# Patient Record
Sex: FEMALE | Race: WHITE | Employment: UNEMPLOYED | URBAN - METROPOLITAN AREA
[De-identification: names, ages, dates, MRNs, and addresses within clinical notes are randomized per-mention and may not be internally consistent; named-entity substitution may affect disease eponyms.]

---

## 2021-09-20 ENCOUNTER — HOSPITAL ENCOUNTER (INPATIENT)
Age: 31
LOS: 2 days | Discharge: HOME | DRG: 177 | End: 2021-09-22
Attending: EMERGENCY MEDICINE | Admitting: INTERNAL MEDICINE
Payer: COMMERCIAL

## 2021-09-20 ENCOUNTER — APPOINTMENT (OUTPATIENT)
Dept: GENERAL RADIOLOGY | Age: 31
DRG: 177 | End: 2021-09-20

## 2021-09-20 DIAGNOSIS — U07.1 COVID-19: ICD-10-CM

## 2021-09-20 DIAGNOSIS — R11.2 NAUSEA VOMITING AND DIARRHEA: ICD-10-CM

## 2021-09-20 DIAGNOSIS — J96.01 ACUTE RESPIRATORY FAILURE WITH HYPOXIA (HCC): Primary | ICD-10-CM

## 2021-09-20 DIAGNOSIS — Z20.822 SUSPECTED COVID-19 VIRUS INFECTION: ICD-10-CM

## 2021-09-20 DIAGNOSIS — R19.7 NAUSEA VOMITING AND DIARRHEA: ICD-10-CM

## 2021-09-20 PROBLEM — R09.02 HYPOXIA: Status: ACTIVE | Noted: 2021-09-20

## 2021-09-20 LAB
A/G RATIO: 1.3 (ref 1.1–2.2)
ALBUMIN SERPL-MCNC: 4.4 G/DL (ref 3.4–5)
ALP BLD-CCNC: 60 U/L (ref 40–129)
ALT SERPL-CCNC: 67 U/L (ref 10–40)
ANION GAP SERPL CALCULATED.3IONS-SCNC: 14 MMOL/L (ref 3–16)
AST SERPL-CCNC: 58 U/L (ref 15–37)
BACTERIA: ABNORMAL /HPF
BASOPHILS ABSOLUTE: 0 K/UL (ref 0–0.2)
BASOPHILS RELATIVE PERCENT: 0.4 %
BILIRUB SERPL-MCNC: 0.6 MG/DL (ref 0–1)
BILIRUBIN URINE: ABNORMAL
BLOOD, URINE: ABNORMAL
BUN BLDV-MCNC: 11 MG/DL (ref 7–20)
CALCIUM SERPL-MCNC: 8.9 MG/DL (ref 8.3–10.6)
CHLORIDE BLD-SCNC: 100 MMOL/L (ref 99–110)
CLARITY: ABNORMAL
CO2: 22 MMOL/L (ref 21–32)
COLOR: ABNORMAL
CREAT SERPL-MCNC: 0.8 MG/DL (ref 0.6–1.1)
EKG ATRIAL RATE: 106 BPM
EKG DIAGNOSIS: NORMAL
EKG P AXIS: 53 DEGREES
EKG P-R INTERVAL: 138 MS
EKG Q-T INTERVAL: 330 MS
EKG QRS DURATION: 80 MS
EKG QTC CALCULATION (BAZETT): 438 MS
EKG R AXIS: 67 DEGREES
EKG T AXIS: 45 DEGREES
EKG VENTRICULAR RATE: 106 BPM
EOSINOPHILS ABSOLUTE: 0 K/UL (ref 0–0.6)
EOSINOPHILS RELATIVE PERCENT: 0.1 %
EPITHELIAL CELLS, UA: ABNORMAL /HPF (ref 0–5)
GFR AFRICAN AMERICAN: >60
GFR NON-AFRICAN AMERICAN: >60
GLOBULIN: 3.4 G/DL
GLUCOSE BLD-MCNC: 109 MG/DL (ref 70–99)
GLUCOSE URINE: NEGATIVE MG/DL
HCG QUALITATIVE: NEGATIVE
HCG(URINE) PREGNANCY TEST: NEGATIVE
HCT VFR BLD CALC: 43.8 % (ref 36–48)
HEMOGLOBIN: 14.7 G/DL (ref 12–16)
KETONES, URINE: 40 MG/DL
LEUKOCYTE ESTERASE, URINE: NEGATIVE
LIPASE: 38 U/L (ref 13–60)
LYMPHOCYTES ABSOLUTE: 1.2 K/UL (ref 1–5.1)
LYMPHOCYTES RELATIVE PERCENT: 29.6 %
MCH RBC QN AUTO: 29.1 PG (ref 26–34)
MCHC RBC AUTO-ENTMCNC: 33.5 G/DL (ref 31–36)
MCV RBC AUTO: 86.8 FL (ref 80–100)
MICROSCOPIC EXAMINATION: YES
MONOCYTES ABSOLUTE: 0.3 K/UL (ref 0–1.3)
MONOCYTES RELATIVE PERCENT: 8.3 %
NEUTROPHILS ABSOLUTE: 2.6 K/UL (ref 1.7–7.7)
NEUTROPHILS RELATIVE PERCENT: 61.6 %
NITRITE, URINE: NEGATIVE
PDW BLD-RTO: 14.3 % (ref 12.4–15.4)
PH UA: 6.5 (ref 5–8)
PLATELET # BLD: 180 K/UL (ref 135–450)
PMV BLD AUTO: 8 FL (ref 5–10.5)
POTASSIUM REFLEX MAGNESIUM: 3.8 MMOL/L (ref 3.5–5.1)
PROTEIN UA: 30 MG/DL
RBC # BLD: 5.05 M/UL (ref 4–5.2)
RBC UA: ABNORMAL /HPF (ref 0–4)
SARS-COV-2, NAAT: NOT DETECTED
SODIUM BLD-SCNC: 136 MMOL/L (ref 136–145)
SPECIFIC GRAVITY UA: 1.01 (ref 1–1.03)
TOTAL PROTEIN: 7.8 G/DL (ref 6.4–8.2)
TROPONIN: <0.01 NG/ML
URINE TYPE: ABNORMAL
UROBILINOGEN, URINE: 0.2 E.U./DL
WBC # BLD: 4.1 K/UL (ref 4–11)
WBC UA: ABNORMAL /HPF (ref 0–5)

## 2021-09-20 PROCEDURE — 71045 X-RAY EXAM CHEST 1 VIEW: CPT

## 2021-09-20 PROCEDURE — 84484 ASSAY OF TROPONIN QUANT: CPT

## 2021-09-20 PROCEDURE — 36415 COLL VENOUS BLD VENIPUNCTURE: CPT

## 2021-09-20 PROCEDURE — 93005 ELECTROCARDIOGRAM TRACING: CPT | Performed by: EMERGENCY MEDICINE

## 2021-09-20 PROCEDURE — 2700000000 HC OXYGEN THERAPY PER DAY

## 2021-09-20 PROCEDURE — 99285 EMERGENCY DEPT VISIT HI MDM: CPT

## 2021-09-20 PROCEDURE — 6370000000 HC RX 637 (ALT 250 FOR IP): Performed by: EMERGENCY MEDICINE

## 2021-09-20 PROCEDURE — 0202U NFCT DS 22 TRGT SARS-COV-2: CPT

## 2021-09-20 PROCEDURE — 85025 COMPLETE CBC W/AUTO DIFF WBC: CPT

## 2021-09-20 PROCEDURE — 80053 COMPREHEN METABOLIC PANEL: CPT

## 2021-09-20 PROCEDURE — 84703 CHORIONIC GONADOTROPIN ASSAY: CPT

## 2021-09-20 PROCEDURE — 96374 THER/PROPH/DIAG INJ IV PUSH: CPT

## 2021-09-20 PROCEDURE — 99221 1ST HOSP IP/OBS SF/LOW 40: CPT | Performed by: NURSE PRACTITIONER

## 2021-09-20 PROCEDURE — 93010 ELECTROCARDIOGRAM REPORT: CPT | Performed by: INTERNAL MEDICINE

## 2021-09-20 PROCEDURE — 1200000000 HC SEMI PRIVATE

## 2021-09-20 PROCEDURE — 81001 URINALYSIS AUTO W/SCOPE: CPT

## 2021-09-20 PROCEDURE — 2580000003 HC RX 258: Performed by: EMERGENCY MEDICINE

## 2021-09-20 PROCEDURE — 83690 ASSAY OF LIPASE: CPT

## 2021-09-20 PROCEDURE — 94761 N-INVAS EAR/PLS OXIMETRY MLT: CPT

## 2021-09-20 PROCEDURE — 2580000003 HC RX 258: Performed by: PHYSICIAN ASSISTANT

## 2021-09-20 PROCEDURE — 6360000002 HC RX W HCPCS: Performed by: EMERGENCY MEDICINE

## 2021-09-20 PROCEDURE — 6360000002 HC RX W HCPCS: Performed by: PHYSICIAN ASSISTANT

## 2021-09-20 PROCEDURE — 87635 SARS-COV-2 COVID-19 AMP PRB: CPT

## 2021-09-20 RX ORDER — ONDANSETRON 2 MG/ML
4 INJECTION INTRAMUSCULAR; INTRAVENOUS EVERY 6 HOURS PRN
Status: DISCONTINUED | OUTPATIENT
Start: 2021-09-20 | End: 2021-09-22 | Stop reason: HOSPADM

## 2021-09-20 RX ORDER — ACETAMINOPHEN 325 MG/1
650 TABLET ORAL EVERY 6 HOURS PRN
Status: DISCONTINUED | OUTPATIENT
Start: 2021-09-20 | End: 2021-09-22 | Stop reason: HOSPADM

## 2021-09-20 RX ORDER — ACETAMINOPHEN 650 MG/1
650 SUPPOSITORY RECTAL EVERY 6 HOURS PRN
Status: DISCONTINUED | OUTPATIENT
Start: 2021-09-20 | End: 2021-09-22 | Stop reason: HOSPADM

## 2021-09-20 RX ORDER — SODIUM CHLORIDE 0.9 % (FLUSH) 0.9 %
5-40 SYRINGE (ML) INJECTION PRN
Status: DISCONTINUED | OUTPATIENT
Start: 2021-09-20 | End: 2021-09-22 | Stop reason: HOSPADM

## 2021-09-20 RX ORDER — SODIUM CHLORIDE 9 MG/ML
25 INJECTION, SOLUTION INTRAVENOUS PRN
Status: DISCONTINUED | OUTPATIENT
Start: 2021-09-20 | End: 2021-09-22 | Stop reason: HOSPADM

## 2021-09-20 RX ORDER — 0.9 % SODIUM CHLORIDE 0.9 %
1000 INTRAVENOUS SOLUTION INTRAVENOUS ONCE
Status: COMPLETED | OUTPATIENT
Start: 2021-09-20 | End: 2021-09-20

## 2021-09-20 RX ORDER — PREDNISONE 20 MG/1
60 TABLET ORAL ONCE
Status: COMPLETED | OUTPATIENT
Start: 2021-09-20 | End: 2021-09-20

## 2021-09-20 RX ORDER — GUAIFENESIN/DEXTROMETHORPHAN 100-10MG/5
5 SYRUP ORAL EVERY 4 HOURS PRN
Status: DISCONTINUED | OUTPATIENT
Start: 2021-09-20 | End: 2021-09-22 | Stop reason: HOSPADM

## 2021-09-20 RX ORDER — ONDANSETRON 2 MG/ML
4 INJECTION INTRAMUSCULAR; INTRAVENOUS ONCE
Status: COMPLETED | OUTPATIENT
Start: 2021-09-20 | End: 2021-09-20

## 2021-09-20 RX ORDER — POLYETHYLENE GLYCOL 3350 17 G/17G
17 POWDER, FOR SOLUTION ORAL DAILY PRN
Status: DISCONTINUED | OUTPATIENT
Start: 2021-09-20 | End: 2021-09-22 | Stop reason: HOSPADM

## 2021-09-20 RX ORDER — SODIUM CHLORIDE 0.9 % (FLUSH) 0.9 %
5-40 SYRINGE (ML) INJECTION EVERY 12 HOURS SCHEDULED
Status: DISCONTINUED | OUTPATIENT
Start: 2021-09-20 | End: 2021-09-22 | Stop reason: HOSPADM

## 2021-09-20 RX ORDER — ONDANSETRON 4 MG/1
4 TABLET, ORALLY DISINTEGRATING ORAL EVERY 8 HOURS PRN
Status: DISCONTINUED | OUTPATIENT
Start: 2021-09-20 | End: 2021-09-22 | Stop reason: HOSPADM

## 2021-09-20 RX ADMIN — PREDNISONE 60 MG: 20 TABLET ORAL at 13:37

## 2021-09-20 RX ADMIN — Medication 10 ML: at 20:18

## 2021-09-20 RX ADMIN — SODIUM CHLORIDE 1000 ML: 9 INJECTION, SOLUTION INTRAVENOUS at 11:43

## 2021-09-20 RX ADMIN — ENOXAPARIN SODIUM 30 MG: 30 INJECTION SUBCUTANEOUS at 20:17

## 2021-09-20 RX ADMIN — ONDANSETRON HYDROCHLORIDE 4 MG: 2 INJECTION, SOLUTION INTRAMUSCULAR; INTRAVENOUS at 11:43

## 2021-09-20 ASSESSMENT — ENCOUNTER SYMPTOMS
COUGH: 0
SHORTNESS OF BREATH: 0
PHOTOPHOBIA: 0
ABDOMINAL DISTENTION: 0
DIARRHEA: 1
NAUSEA: 1
WHEEZING: 0
RHINORRHEA: 0
VOMITING: 1
BACK PAIN: 0

## 2021-09-20 ASSESSMENT — PAIN SCALES - GENERAL: PAINLEVEL_OUTOF10: 0

## 2021-09-20 NOTE — Clinical Note
Patient Class: Inpatient [101]   REQUIRED: Diagnosis: Hypoxia [848763]   Estimated Length of Stay: Estimated stay of more than 2 midnights   Telemetry/Cardiac Monitoring Required?: Yes

## 2021-09-20 NOTE — H&P
Hospital Medicine History & Physical      PCP: Ivanna Ovalle, APRN - CNP    Date of Admission: 9/20/2021    Date of Service: Pt seen/examined on 9/20/2021     Chief Complaint:    Chief Complaint   Patient presents with    Diarrhea     with vomiting x 4 days. History Of Present Illness: The patient is a 32 y.o. female with lymphedema and h/o cellulitis who presents to [facility] with c/o vomiting and diarrhea. She reports symptoms ongoing since last Thursday. She has had nausea, vomiting, diarrhea, cough, and fatigue. Denies fever, shortness of breath, chest pain, or abdominal pain. She is not vaccinated. Per EMR, she has had positive COVID contacts. She is hypoxic and requiring 2 L O2. CXR negative. Labs with elevated LFT's. Admitted to med-surg. Pulmonology consulted. Past Medical History:        Diagnosis Date    Cellulitis     Lymphedema        Past Surgical History:    History reviewed. No pertinent surgical history. Medications Prior to Admission:    Prior to Admission medications    Not on File       Allergies:  Patient has no known allergies. Social History:    TOBACCO:   reports that she has never smoked. She has never used smokeless tobacco.  ETOH:   reports no history of alcohol use. Family History:   Positive as follows:    History reviewed. No pertinent family history. REVIEW OF SYSTEMS:       Constitutional: Negative for fever + fatigue  Respiratory: Negative  for dyspnea, + cough   Cardiovascular: Negative for chest pain   Gastrointestinal: + nausea, vomiting, diarrhea   Genitourinary: Negative for hematuria   Musculoskeletal: Negative for arthralgias   Skin: Negative for rash   Neurological: Negative for syncope   Psychiatric/Behavorial: Negative for anxiety    PHYSICAL EXAM:    /78   Pulse 113   Temp 99.4 °F (37.4 °C) (Oral)   Resp 18   Ht 5' 6\" (1.676 m)   Wt 300 lb (136.1 kg)   SpO2 94%   BMI 48.42 kg/m²     Gen: No distress. Alert. Eyes: PERRL. No sclera icterus. No conjunctival injection. ENT: No discharge. Pharynx clear. Neck: Trachea midline. Resp: No accessory muscle use. + crackles. No wheezes. No rhonchi. CV: Regular rate. Regular rhythm. No murmur. No rub. No edema. GI: Non-tender. Non-distended. Normal bowel sounds. No hernia. Skin: Warm and dry. No nodule on exposed extremities. No rash on exposed extremities. M/S: No cyanosis. No joint deformity. No clubbing. Neuro: Awake. Grossly nonfocal    Psych: Oriented x 3. No anxiety or agitation. CBC:   Recent Labs     09/20/21  1100   WBC 4.1   HGB 14.7   HCT 43.8   MCV 86.8        BMP:   Recent Labs     09/20/21  1100      K 3.8      CO2 22   BUN 11   CREATININE 0.8     LIVER PROFILE:   Recent Labs     09/20/21  1100   AST 58*   ALT 67*   LIPASE 38.0   BILITOT 0.6   ALKPHOS 60     UA:  Recent Labs     09/20/21  1045   COLORU RED*   PHUR 6.5   WBCUA 0-2   RBCUA *   BACTERIA Rare*   CLARITYU CLOUDY*   SPECGRAV 1.010   LEUKOCYTESUR Negative   UROBILINOGEN 0.2   BILIRUBINUR SMALL*   BLOODU LARGE*   GLUCOSEU Negative         CARDIAC ENZYMES  Recent Labs     09/20/21  1100   TROPONINI <0.01         CULTURES  COVID: not detected    EKG:  I have reviewed the EKG with the following interpretation:   Sinus tachycardia    RADIOLOGY  XR CHEST PORTABLE   Final Result   No acute process. Active Problems:    Hypoxia  Resolved Problems:    * No resolved hospital problems. *        ASSESSMENT/PLAN:  Acute hypoxia  Possible COVID  - admit to med-surg. Pulmonology consulted  - hypoxic, requiring 2 L O2.  - received prednisone 60 mg once today. - symptoms ongoing since last Thursday. Rapid COVID negative. Symptoms are concerning for COVID and she is requiring 2 L. Keep in precautions for now. Nausea, vomiting, diarrhea  - regular diet ordered  - IVF's. - antiemetics as needed. Hematuria  - Currently on menses.      Elevated LFT's  - Trend LFT's. Morbid Obesity  - Body mass index is 48.42 kg/m². - Complicating assessment and treatment. Placing patient at risk for multiple co-morbidities as well as early death and contributing to the patient's presentation.   - Counseled on weight loss. DVT Prophylaxis: Lovenox 30 mg BID  Diet: ADULT DIET;  Regular  Code Status: Full Code    Evelyn Romero FNP-C  9/20/2021

## 2021-09-20 NOTE — ED NOTES
Pt report given to [employer] team. Pt remains alert and oriented, no apparent signs of distress. Vitals remain WNL. Transport team denies further questions. Pt care transferred at this time.      Iliana Pennington RN  09/20/21 0839

## 2021-09-20 NOTE — ED NOTES
Report called to [facility], spoke to Le Harvey. RN denies further questions. Pt transferred at this time.      Armani Pressley RN  09/20/21 4989

## 2021-09-20 NOTE — PROGRESS NOTES
Handoff report given and pt care transferred to [facility]. Pt denies needs at this time. Call light within reach.

## 2021-09-20 NOTE — PLAN OF CARE
Hypoxia  Suspected COVID 19, will need test on arrival to [facility].  +contacts in her community   N/V/D    Will hold off on ordering decadron or remdesivir until confirmed COVID 19 positive     German Pearson PA-C  9/20/2021 2:24 PM

## 2021-09-20 NOTE — ED NOTES
Pt ambulated by Medic. Medic states pulse ox dropped to 85% and . MD made aware.      Ivis Moya RN  09/20/21 2548

## 2021-09-20 NOTE — PROGRESS NOTES
Admission questions complete. Refused 4 Eyes assessment. A/O x 4. Denies any pain. Dietary notified. Oriented to room. Bed locked/lowest position. Call light within reach.

## 2021-09-21 PROBLEM — U07.1 ACUTE RESPIRATORY DISEASE DUE TO COVID-19 VIRUS: Status: ACTIVE | Noted: 2021-09-21

## 2021-09-21 PROBLEM — J06.9 ACUTE RESPIRATORY DISEASE DUE TO COVID-19 VIRUS: Status: ACTIVE | Noted: 2021-09-21

## 2021-09-21 LAB
A/G RATIO: 1.2 (ref 1.1–2.2)
ALBUMIN SERPL-MCNC: 4.3 G/DL (ref 3.4–5)
ALP BLD-CCNC: 58 U/L (ref 40–129)
ALT SERPL-CCNC: 61 U/L (ref 10–40)
ANION GAP SERPL CALCULATED.3IONS-SCNC: 15 MMOL/L (ref 3–16)
AST SERPL-CCNC: 51 U/L (ref 15–37)
BASOPHILS ABSOLUTE: 0 K/UL (ref 0–0.2)
BASOPHILS RELATIVE PERCENT: 0.2 %
BILIRUB SERPL-MCNC: 0.4 MG/DL (ref 0–1)
BUN BLDV-MCNC: 10 MG/DL (ref 7–20)
CALCIUM SERPL-MCNC: 9 MG/DL (ref 8.3–10.6)
CHLORIDE BLD-SCNC: 104 MMOL/L (ref 99–110)
CO2: 20 MMOL/L (ref 21–32)
CREAT SERPL-MCNC: 0.7 MG/DL (ref 0.6–1.1)
EOSINOPHILS ABSOLUTE: 0 K/UL (ref 0–0.6)
EOSINOPHILS RELATIVE PERCENT: 0 %
GFR AFRICAN AMERICAN: >60
GFR NON-AFRICAN AMERICAN: >60
GLOBULIN: 3.6 G/DL
GLUCOSE BLD-MCNC: 106 MG/DL (ref 70–99)
HCT VFR BLD CALC: 45.2 % (ref 36–48)
HEMOGLOBIN: 14.9 G/DL (ref 12–16)
LYMPHOCYTES ABSOLUTE: 1.4 K/UL (ref 1–5.1)
LYMPHOCYTES RELATIVE PERCENT: 29.3 %
MCH RBC QN AUTO: 29.4 PG (ref 26–34)
MCHC RBC AUTO-ENTMCNC: 32.9 G/DL (ref 31–36)
MCV RBC AUTO: 89.4 FL (ref 80–100)
MONOCYTES ABSOLUTE: 0.5 K/UL (ref 0–1.3)
MONOCYTES RELATIVE PERCENT: 10.1 %
NEUTROPHILS ABSOLUTE: 2.8 K/UL (ref 1.7–7.7)
NEUTROPHILS RELATIVE PERCENT: 60.4 %
ORGANISM: ABNORMAL
PDW BLD-RTO: 14.5 % (ref 12.4–15.4)
PLATELET # BLD: 184 K/UL (ref 135–450)
PMV BLD AUTO: 9.1 FL (ref 5–10.5)
POTASSIUM REFLEX MAGNESIUM: 4 MMOL/L (ref 3.5–5.1)
PRO-BNP: 49 PG/ML (ref 0–124)
PROCALCITONIN: 0.05 NG/ML (ref 0–0.15)
RBC # BLD: 5.06 M/UL (ref 4–5.2)
REPORT: NORMAL
RESPIRATORY PANEL PCR: ABNORMAL
SODIUM BLD-SCNC: 139 MMOL/L (ref 136–145)
TOTAL PROTEIN: 7.9 G/DL (ref 6.4–8.2)
WBC # BLD: 4.7 K/UL (ref 4–11)

## 2021-09-21 PROCEDURE — 2500000003 HC RX 250 WO HCPCS: Performed by: INTERNAL MEDICINE

## 2021-09-21 PROCEDURE — 83880 ASSAY OF NATRIURETIC PEPTIDE: CPT

## 2021-09-21 PROCEDURE — 86140 C-REACTIVE PROTEIN: CPT

## 2021-09-21 PROCEDURE — 84145 PROCALCITONIN (PCT): CPT

## 2021-09-21 PROCEDURE — 99255 IP/OBS CONSLTJ NEW/EST HI 80: CPT | Performed by: INTERNAL MEDICINE

## 2021-09-21 PROCEDURE — 99233 SBSQ HOSP IP/OBS HIGH 50: CPT | Performed by: INTERNAL MEDICINE

## 2021-09-21 PROCEDURE — 94761 N-INVAS EAR/PLS OXIMETRY MLT: CPT

## 2021-09-21 PROCEDURE — 36415 COLL VENOUS BLD VENIPUNCTURE: CPT

## 2021-09-21 PROCEDURE — XW033E5 INTRODUCTION OF REMDESIVIR ANTI-INFECTIVE INTO PERIPHERAL VEIN, PERCUTANEOUS APPROACH, NEW TECHNOLOGY GROUP 5: ICD-10-PCS | Performed by: INTERNAL MEDICINE

## 2021-09-21 PROCEDURE — 6360000002 HC RX W HCPCS: Performed by: INTERNAL MEDICINE

## 2021-09-21 PROCEDURE — 2580000003 HC RX 258: Performed by: INTERNAL MEDICINE

## 2021-09-21 PROCEDURE — 80053 COMPREHEN METABOLIC PANEL: CPT

## 2021-09-21 PROCEDURE — 1200000000 HC SEMI PRIVATE

## 2021-09-21 PROCEDURE — 6360000002 HC RX W HCPCS: Performed by: PHYSICIAN ASSISTANT

## 2021-09-21 PROCEDURE — 2700000000 HC OXYGEN THERAPY PER DAY

## 2021-09-21 PROCEDURE — 85025 COMPLETE CBC W/AUTO DIFF WBC: CPT

## 2021-09-21 PROCEDURE — 2580000003 HC RX 258: Performed by: PHYSICIAN ASSISTANT

## 2021-09-21 RX ORDER — 0.9 % SODIUM CHLORIDE 0.9 %
30 INTRAVENOUS SOLUTION INTRAVENOUS PRN
Status: DISCONTINUED | OUTPATIENT
Start: 2021-09-21 | End: 2021-09-22 | Stop reason: HOSPADM

## 2021-09-21 RX ADMIN — REMDESIVIR 200 MG: 100 INJECTION, POWDER, LYOPHILIZED, FOR SOLUTION INTRAVENOUS at 17:36

## 2021-09-21 RX ADMIN — ENOXAPARIN SODIUM 30 MG: 30 INJECTION SUBCUTANEOUS at 10:35

## 2021-09-21 RX ADMIN — Medication 10 ML: at 20:54

## 2021-09-21 RX ADMIN — ENOXAPARIN SODIUM 30 MG: 30 INJECTION SUBCUTANEOUS at 20:54

## 2021-09-21 RX ADMIN — DEXAMETHASONE 6 MG: 4 TABLET ORAL at 17:26

## 2021-09-21 RX ADMIN — SODIUM CHLORIDE 30 ML: 9 INJECTION, SOLUTION INTRAVENOUS at 17:30

## 2021-09-21 RX ADMIN — Medication 10 ML: at 10:35

## 2021-09-21 ASSESSMENT — PAIN SCALES - GENERAL
PAINLEVEL_OUTOF10: 0
PAINLEVEL_OUTOF10: 0

## 2021-09-21 NOTE — CONSULTS
Reason for referral and CC: COVID 19 disease    HISTORY OF PRESENT ILLNESS: 49-year-old female with a history of morbid obesity and lymphedema presented with nausea vomiting and diarrhea. Symptoms ongoing for 4 days. She denied chest pain or shortness of breath on admission. She is not vaccinated and has had exposure to Covid positive individuals. Her GI symptoms resolved the day before admission. She denies shortness of breath at rest.  Shee feels better with oxygen. Past Medical History:   Diagnosis Date    Cellulitis     COVID-19 09/20/2021    Lymphedema      History reviewed. No pertinent surgical history. Family History  family history is not on file. Social History:  reports that she has never smoked. She has never used smokeless tobacco.   reports no history of alcohol use. ALLERGIES:  Patient has No Known Allergies. Continuous Infusions:   sodium chloride       Scheduled Meds:   sodium chloride flush  5-40 mL IntraVENous 2 times per day    enoxaparin  30 mg SubCUTAneous BID     PRN Meds:  sodium chloride flush, sodium chloride, ondansetron **OR** ondansetron, polyethylene glycol, acetaminophen **OR** acetaminophen, guaiFENesin-dextromethorphan    REVIEW OF SYSTEMS:  Constitutional: Negative for fever  HENT: Negative for sore throat  Eyes: Negative for redness   Respiratory: + for dyspnea, cough  Cardiovascular: Negative for chest pain  Gastrointestinal: Negative for vomiting, diarrhea   Genitourinary: Negative for hematuria   Musculoskeletal: Negative for arthralgias   Skin: Negative for rash  Neurological: Negative for syncope  Hematological: Negative for adenopathy  Psychiatric/Behavorial: Negative for anxiety    PHYSICAL EXAM: /76   Pulse 93   Temp 97.3 °F (36.3 °C) (Oral)   Resp 18   Ht 5' 6\" (1.676 m)   Wt 300 lb (136.1 kg)   SpO2 95%   BMI 48.42 kg/m²  on 4 L  Constitutional:  No acute distress. Eyes: PERRL. Conjunctivae anicteric. ENT: Normal nose.  Normal tongue. Neck:  Trachea is midline. No thyroid tenderness. Respiratory: No accessory muscle usage. nodecreased breath sounds. No wheezes. No rales. No Rhonchi. Cardiovascular: Normal S1S2. No digit clubbing. No digit cyanosis. No LE edema. Capillary refill is normal.  Gastrointestinal: No mass palpated. No tenderness palpated. No umbilical hernia. Lymphatic: No cervical or supraclavicular adenopathy. Skin: No rash on the exposed extremities. No Nodules or induration on exposed extremities. Psychiatric: No anxiety or Agitation. Alert and Oriented to person, place and time. CBC:   Recent Labs     09/20/21  1100 09/21/21  0524   WBC 4.1 4.7   HGB 14.7 14.9   HCT 43.8 45.2   MCV 86.8 89.4    184     BMP:   Recent Labs     09/20/21  1100 09/21/21  0524    139   K 3.8 4.0    104   CO2 22 20*   BUN 11 10   CREATININE 0.8 0.7        Recent Labs     09/20/21  1100 09/21/21  0524   AST 58* 51*   ALT 67* 61*   LIPASE 38.0  --    BILITOT 0.6 0.4   ALKPHOS 60 58     No results for input(s): PROTIME, INR in the last 72 hours. Recent Labs     09/20/21  1045   COLORU RED*   PHUR 6.5   WBCUA 0-2   RBCUA *   BACTERIA Rare*   CLARITYU CLOUDY*   SPECGRAV 1.010   LEUKOCYTESUR Negative   UROBILINOGEN 0.2   BILIRUBINUR SMALL*   BLOODU LARGE*   GLUCOSEU Negative     No results for input(s): PHART, JDT8RBU, PO2ART in the last 72 hours. Rapid covid neg  SARS CoV-2 PCR + on resp panel    Chest imaging was reviewed by me and showed clear lungs    ASSESSMENT:  · Acute hypoxic respiratory failure  · COVID 19 disease  · Mldly elevated transaminases  · Morbid obesity    PLAN:  Droplet Plus Airborne Precautions   Supplemental oxygen to keep saturation greater than 92%  Decadron 6 mg QD, D# 1, monitor glucose closely  Remdesevir D# 1, daily monitoring of LFT to prevent liver toxicity. Risk and benefits of this medication were specifically discussed with the patient.   Tocilizumab if she deteriorates  Lovenox twice daily    Thank you Adama Ramirez, * for this consult

## 2021-09-21 NOTE — FLOWSHEET NOTE
09/21/21 0136   Vital Signs   Temp 98.1 °F (36.7 °C)   Temp Source Oral   Pulse 71   Heart Rate Source Monitor   Resp 18   /74   BP Location Right upper arm   Patient Position Semi fowlers   Level of Consciousness Alert (0)   MEWS Score 1   Oxygen Therapy   SpO2 (!) 86 %   O2 Device Nasal cannula   O2 Flow Rate (L/min) 3 L/min   Pt awake at this time.  Oxygen increased to 4 liters

## 2021-09-21 NOTE — PLAN OF CARE
Problem: Infection:  Goal: Will remain free from infection  Description: Will remain free from infection  9/20/2021 2256 by Betzaida Hawley RN  Outcome: Ongoing  9/20/2021 1610 by Dora Carrasquillo RN  Outcome: Ongoing     Problem: Safety:  Goal: Free from accidental physical injury  Description: Free from accidental physical injury  9/20/2021 2256 by Betzaida Hawley RN  Outcome: Ongoing  9/20/2021 1610 by Dora Carrasquillo RN  Outcome: Ongoing  Goal: Free from intentional harm  Description: Free from intentional harm  9/20/2021 2256 by Betzaida Hawley RN  Outcome: Ongoing  9/20/2021 1610 by Dora Carrasquillo RN  Outcome: Ongoing     Problem: Daily Care:  Goal: Daily care needs are met  Description: Daily care needs are met  9/20/2021 2256 by Betzaida Hawley RN  Outcome: Ongoing  9/20/2021 1610 by Dora Carrasquillo RN  Outcome: Ongoing     Problem: Pain:  Goal: Patient's pain/discomfort is manageable  Description: Patient's pain/discomfort is manageable  9/20/2021 2256 by Betzaida Hawley RN  Outcome: Ongoing  9/20/2021 1610 by Dora Carrasquillo RN  Outcome: Ongoing     Problem: Skin Integrity:  Goal: Skin integrity will stabilize  Description: Skin integrity will stabilize  9/20/2021 1610 by Dora Carrasquillo RN  Outcome: Ongoing     Problem: Discharge Planning:  Goal: Patients continuum of care needs are met  Description: Patients continuum of care needs are met  9/20/2021 2256 by Betzaida Hawley RN  Outcome: Ongoing  9/20/2021 1610 by Dora Carrasquillo RN  Outcome: Ongoing

## 2021-09-21 NOTE — PLAN OF CARE
Problem: Infection:  Goal: Will remain free from infection  Description: Will remain free from infection  9/21/2021 1123 by Julio Cesar Berumen RN  Outcome: Ongoing  9/20/2021 2256 by Burt Moore RN  Outcome: Ongoing     Problem: Safety:  Goal: Free from accidental physical injury  Description: Free from accidental physical injury  9/20/2021 2256 by Burt Moore RN  Outcome: Ongoing  Goal: Free from intentional harm  Description: Free from intentional harm  9/20/2021 2256 by Burt Moore RN  Outcome: Ongoing     Problem: Daily Care:  Goal: Daily care needs are met  Description: Daily care needs are met  9/21/2021 1123 by Julio Cesar Berumen RN  Outcome: Ongoing  9/20/2021 2256 by Burt Moore RN  Outcome: Ongoing     Problem: Pain:  Goal: Patient's pain/discomfort is manageable  Description: Patient's pain/discomfort is manageable  9/20/2021 2256 by Burt Moore RN  Outcome: Ongoing     Problem: Skin Integrity:  Goal: Skin integrity will stabilize  Description: Skin integrity will stabilize  Outcome: Ongoing     Problem: Discharge Planning:  Goal: Patients continuum of care needs are met  Description: Patients continuum of care needs are met  9/20/2021 2256 by Burt Moore RN  Outcome: Ongoing

## 2021-09-21 NOTE — CARE COORDINATION
Case Management Assessment  Initial Evaluation      Patient Name: Rudy Thompson  YOB: 1990  Diagnosis: Hypoxia [R09.02]  Acute respiratory failure with hypoxia ([address] [J96.01]  Nausea vomiting and diarrhea [R11.2, R19.7]  Suspected COVID-19 virus infection [Z20.822]  Date / Time: 9/20/2021 10:34 AM    Admission status/Date: 09/20/2021 Inpatient  Chart Reviewed: Yes      Patient Interviewed: Yes   Family Interviewed:  No      Hospitalization in the last 30 days:  No      Health Care Decision Maker :   Primary Decision Maker: Don Fajardo - Spouse - [phone]    (CM - must 1st enter selection under Navigator - emergency contact- [place] Relationship and pick relationship)   Who do you trust or have selected to make healthcare decisions for you      Met with: pt   Interview conducted  (bedside/phone): phone call to bedside phone due to covid isolation     Current PCP: Jodie Jalloh APRN-CNP    Financial  None; referral to Richa Hampton in Financial Counselor.  Pt is aware and agreeable  Precert required for SNF : N          3 night stay required - N    ADLS  Support Systems/Care Needs: Spouse/Significant Other  Transportation: Pt stated they don't have a car so they hire someone or use Horse/Buggy to get around places    Meal Preparation: self    Housing  Living Arrangements: pt lives at home with her family  Steps: 3  Intent for return to present living arrangements: Yes  Identified Issues: [id number] [facility] [address] : No Agency:(Services)  Type of Home Care Services: None  Passport/Waiver : No  :                      Phone Number:    Passport/Waiver Services: n/a          Durable Medical Equiptment   DME Provider: n/a  Equipment:   Walker___Cane___RTS___ BSC___Shower Chair___Hospital Bed___W/C____Other________  02 at ____Liter(s)---wears(frequency)_______ HHN ___ CPAP___ BiPap___   N/A___x_      Home O2 Use :  No    If No for home O2---if presently on O2 during hospitalization:  Yes  if yes CM to follow for potential DC O2 need  Informed of need for care provider to bring portable home O2 tank on day of discharge for nursing to connect prior to leaving:   Not Indicated  Verbalized agreement/Understanding:   Not indicated     Community Service Affiliation  Dialysis:  No    · Agency:  · Location:  · Dialysis Schedule:  · Phone:   · Fax: Other Community Services: n/a    DISCHARGE PLAN: Explained Case Management role/services. Chart review completed. Called and spoke with pt via call to bedside phone due to covid isolation. Pt stated she is independent and will return home. She denied needs or questions for CM. Spoke with Anastasia Perkins in Financial Counseling who stated she spoke with pt and she is declining Medicaid / [facility] but gave her information for a [employer] that she said would help with her bill. Pt is self pay which may be barrier if she needs home oxygen. CM will follow. Please notify CM if needs or concerns arise.

## 2021-09-21 NOTE — PROGRESS NOTES
Bedside report given to [facility] Ragan RN pt in stable condition no needs at this time.  Call light within reach

## 2021-09-21 NOTE — PROGRESS NOTES
Progress Note    Admit Date:  9/20/2021    Subjective:  Ms. Shanelle Reinoso was admitted to the hospital with vomiting and diarrhea. She is not vaccinated against COVID 19. She is [place] and several members in the community have tested positive for COVID 19. She was found to be hypoxic in the ER and admitted. Her rapid COVID test was negative. After I made rounds her PCR test came back positive for COVID 19. She is on 2 L of oxygen. Her GI symptoms are improved. She is eager to go home. Objective:   /76   Pulse 93   Temp 97.3 °F (36.3 °C) (Oral)   Resp 18   Ht 5' 6\" (1.676 m)   Wt 300 lb (136.1 kg)   SpO2 95%   BMI 48.42 kg/m²        Intake/Output Summary (Last 24 hours) at 9/21/2021 1822  Last data filed at 9/21/2021 1004  Gross per 24 hour   Intake 720 ml   Output --   Net 720 ml       Physical Exam:    General appearance: alert, appears stated age and cooperative  Head: Normocephalic, without obvious abnormality, atraumatic  Eyes: conjunctivae/corneas clear. PERRL, EOM's intact. Neck: no adenopathy, no carotid bruit, no JVD, supple, symmetrical, trachea midline and thyroid not enlarged, symmetric, no tenderness/mass/nodules  Lungs: no wheezes or rhonchi. Has mild crackles.    Heart: regular rate and rhythm, S1, S2 normal, no murmur, click, rub or gallop  Abdomen: soft, non-tender; bowel sounds normal; no masses,  no organomegaly  Extremities: extremities normal, atraumatic, no cyanosis or edema  Pulses: 2+ and symmetric  Skin: Skin color, texture, turgor normal. No rashes or lesions  Neurologic: Grossly normal    Scheduled Meds:   dexamethasone  6 mg Oral Daily    [START ON 9/22/2021] remdesivir IVPB  100 mg IntraVENous Q24H    sodium chloride flush  5-40 mL IntraVENous 2 times per day    enoxaparin  30 mg SubCUTAneous BID       Continuous Infusions:   sodium chloride         PRN Meds:  sodium chloride, sodium chloride flush, sodium chloride, ondansetron **OR** ondansetron, polyethylene glycol, acetaminophen **OR** acetaminophen, guaiFENesin-dextromethorphan      Data:  CBC:   Recent Labs     09/20/21  1100 09/21/21  0524   WBC 4.1 4.7   HGB 14.7 14.9   HCT 43.8 45.2   MCV 86.8 89.4    184     BMP:   Recent Labs     09/20/21  1100 09/21/21  0524    139   K 3.8 4.0    104   CO2 22 20*   BUN 11 10   CREATININE 0.8 0.7     LIVER PROFILE:   Recent Labs     09/20/21  1100 09/21/21  0524   AST 58* 51*   ALT 67* 61*   LIPASE 38.0  --    BILITOT 0.6 0.4   ALKPHOS 60 58     Results for Damaso Plaza (MRN [id number]) as of 9/21/2021 18:28   Ref. Range 9/20/2021 11:00   hCG Qual Latest Ref Range: Detects HCG level >10 MIU/mL  Negative     PT/INR: No results for input(s): PROTIME, INR in the last 72 hours. Cultures:           XR CHEST PORTABLE   Final Result   No acute process. Assessment:  Principal Problem:    Acute respiratory disease due to COVID-19 virus  Active Problems:    Nausea vomiting and diarrhea    Suspected COVID-19 virus infection    Elevated LFTs  Resolved Problems:    * No resolved hospital problems. *      Plan:    #Acute respiratory failure with hypoxemia due to COVID-19. Patient admitted to hospital.  Pulmonary consultation. Supplemental oxygen. Continuous pulse ox and telemetry    #COVID-19 pneumonia. Work-up consistent with COVID-19 pneumonia. She is hypoxic. She meets criteria for treatment. Started on Decadron 6 mg a day #2. Started on remdesivir day#1. Monitor LFTs and CBC due to high risk medication. Consider Tocilizumab if she worsens     #GI symptoms due to COVID improved. #Transaminitis due to COVID 19. Monitor closely with patient receiving Remdesivir. #Lovenox 30 mg bid for DVT prophylaxis. IMPORTANT: Please note that some portions of this note may have been created using Dragon voice recognition software.  Some \"sound-alike\" and totally wrong word substitutions may have taken place due to known inherent limitations of any such software, including this voice recognition software. In spite of efforts to eliminate such errors, some may not have been corrected. So please read the note with this in mind and recognize such mistakes and understand the correct version using the  context. If there are still uncertainties in the mind of the medical provider reading this note about any aspect of the note, the provider can feel free to contact me. Thanks.        Ashish Ghosh MD, MD 9/21/2021 6:22 PM

## 2021-09-21 NOTE — ACP (ADVANCE CARE PLANNING)
Advance Care Planning   Healthcare Decision Maker:    Primary Decision Maker: Randal Taylor - [place] - [phone]    Click here to complete Healthcare Decision Makers including selection of the Healthcare Decision Maker Relationship (ie \"Primary\").

## 2021-09-21 NOTE — FLOWSHEET NOTE
09/20/21 2010   Vital Signs   Temp 97.4 °F (36.3 °C)   Temp Source Oral   Pulse 89   Heart Rate Source Monitor   Resp 18   /70   BP Location Right upper arm   Patient Position Semi fowlers   Level of Consciousness Alert (0)   MEWS Score 1   Oxygen Therapy   SpO2 92 %   O2 Device Nasal cannula   O2 Flow Rate (L/min) 3 L/min   Pt A/O x 4 assessment completed. Meds given per MAR. COVID PCR collected and sent to lab. Pt denies any needs.  Call light within reach

## 2021-09-22 VITALS
HEART RATE: 87 BPM | BODY MASS INDEX: 47.09 KG/M2 | WEIGHT: 293 LBS | HEIGHT: 66 IN | OXYGEN SATURATION: 90 % | RESPIRATION RATE: 18 BRPM | SYSTOLIC BLOOD PRESSURE: 112 MMHG | DIASTOLIC BLOOD PRESSURE: 71 MMHG | TEMPERATURE: 97.6 F

## 2021-09-22 LAB
A/G RATIO: 1 (ref 1.1–2.2)
ALBUMIN SERPL-MCNC: 3.9 G/DL (ref 3.4–5)
ALP BLD-CCNC: 57 U/L (ref 40–129)
ALT SERPL-CCNC: 48 U/L (ref 10–40)
ANION GAP SERPL CALCULATED.3IONS-SCNC: 17 MMOL/L (ref 3–16)
AST SERPL-CCNC: 32 U/L (ref 15–37)
BILIRUB SERPL-MCNC: 0.4 MG/DL (ref 0–1)
BUN BLDV-MCNC: 10 MG/DL (ref 7–20)
C-REACTIVE PROTEIN: 8.7 MG/L (ref 0–5.1)
CALCIUM SERPL-MCNC: 8.9 MG/DL (ref 8.3–10.6)
CHLORIDE BLD-SCNC: 103 MMOL/L (ref 99–110)
CO2: 18 MMOL/L (ref 21–32)
CREAT SERPL-MCNC: 0.6 MG/DL (ref 0.6–1.1)
GFR AFRICAN AMERICAN: >60
GFR NON-AFRICAN AMERICAN: >60
GLOBULIN: 3.8 G/DL
GLUCOSE BLD-MCNC: 106 MG/DL (ref 70–99)
HCT VFR BLD CALC: 44.5 % (ref 36–48)
HEMOGLOBIN: 14.6 G/DL (ref 12–16)
MCH RBC QN AUTO: 29.3 PG (ref 26–34)
MCHC RBC AUTO-ENTMCNC: 32.8 G/DL (ref 31–36)
MCV RBC AUTO: 89.4 FL (ref 80–100)
PDW BLD-RTO: 14.4 % (ref 12.4–15.4)
PLATELET # BLD: 231 K/UL (ref 135–450)
PMV BLD AUTO: 8 FL (ref 5–10.5)
POTASSIUM REFLEX MAGNESIUM: 4.5 MMOL/L (ref 3.5–5.1)
RBC # BLD: 4.99 M/UL (ref 4–5.2)
SODIUM BLD-SCNC: 138 MMOL/L (ref 136–145)
TOTAL PROTEIN: 7.7 G/DL (ref 6.4–8.2)
WBC # BLD: 3 K/UL (ref 4–11)

## 2021-09-22 PROCEDURE — 85027 COMPLETE CBC AUTOMATED: CPT

## 2021-09-22 PROCEDURE — 80053 COMPREHEN METABOLIC PANEL: CPT

## 2021-09-22 PROCEDURE — 99239 HOSP IP/OBS DSCHRG MGMT >30: CPT | Performed by: INTERNAL MEDICINE

## 2021-09-22 PROCEDURE — 36415 COLL VENOUS BLD VENIPUNCTURE: CPT

## 2021-09-22 PROCEDURE — 6360000002 HC RX W HCPCS: Performed by: PHYSICIAN ASSISTANT

## 2021-09-22 PROCEDURE — 6360000002 HC RX W HCPCS: Performed by: INTERNAL MEDICINE

## 2021-09-22 RX ORDER — DEXAMETHASONE 6 MG/1
6 TABLET ORAL DAILY
Qty: 8 TABLET | Refills: 0 | Status: SHIPPED | OUTPATIENT
Start: 2021-09-23 | End: 2021-10-01

## 2021-09-22 RX ADMIN — ENOXAPARIN SODIUM 30 MG: 30 INJECTION SUBCUTANEOUS at 09:32

## 2021-09-22 RX ADMIN — DEXAMETHASONE 6 MG: 4 TABLET ORAL at 09:32

## 2021-09-22 ASSESSMENT — PAIN SCALES - GENERAL: PAINLEVEL_OUTOF10: 0

## 2021-09-22 NOTE — DISCHARGE SUMMARY
Name:  Karla Duque  Room:  0218/0218-01  MRN:    [id number]    Discharge Summary      This discharge summary is in conjunction with a complete physical exam done on the day of discharge. Attending Physician:  Dr. Keisha Amador    Discharging Physician: Dr. Sarabia Mandril: 9/20/2021  Discharge:   9/22/2021    Diagnoses this Admission    Principal Problem:    Acute respiratory disease due to COVID-19 virus  Active Problems:    Nausea vomiting and diarrhea    Suspected COVID-19 virus infection    Elevated LFTs    COVID-19  Resolved Problems:    * No resolved hospital problems. *          Procedures (Please Review Full Report for Details)  N/A      Consults    Pulmonology    HPI:    The patient is a 32 y.o. female with lymphedema and h/o cellulitis who presents to [facility] with c/o vomiting and diarrhea. She reports symptoms ongoing since last Thursday. She has had nausea, vomiting, diarrhea, cough, and fatigue. Denies fever, shortness of breath, chest pain, or abdominal pain. She is not vaccinated. Per EMR, she has had positive COVID contacts.      She is hypoxic and requiring 2 L O2. CXR negative. Labs with elevated LFT's. Admitted to med-surg. Pulmonology consulted. Physical Exam at Discharge:  /71   Pulse 87   Temp 97.6 °F (36.4 °C) (Oral)   Resp 18   Ht 5' 6\" (1.676 m)   Wt 300 lb (136.1 kg)   SpO2 90%   BMI 48.42 kg/m²     General appearance: alert, appears stated age and cooperative  Head: Normocephalic, without obvious abnormality, atraumatic  Eyes: conjunctivae/corneas clear. PERRL, EOM's intact. Neck: no adenopathy, no carotid bruit, no JVD, supple, symmetrical, trachea midline and thyroid not enlarged, symmetric, no tenderness/mass/nodules  Lungs: no wheezes or rhonchi. Has mild crackles.    Heart: regular rate and rhythm, S1, S2 normal, no murmur, click, rub or gallop  Abdomen: soft, non-tender; bowel sounds normal; no masses,  no organomegaly  Extremities: extremities normal, atraumatic, no cyanosis or edema  Pulses: 2+ and symmetric  Skin: Skin color, texture, turgor normal. No rashes or lesions  Neurologic: Grossly normal    Hospital Course    #Acute respiratory failure with hypoxemia due to COVID-19. Patient admitted to hospital.  Pulmonary consultation. Supplemental oxygen. Continuous pulse ox and telemetry. Was on up to 4 L O2. Weaned off of oxygen. Stable on RA. Given Rx for Decadron.      #COVID-19 pneumonia. Work-up consistent with COVID-19 pneumonia. She was hypoxic. She meets criteria for treatment. Started on Decadron 6 mg a day #2. Started on remdesivir day#2. Monitored LFTs and CBC due to high risk medication. Consider Tocilizumab if she worsens      #GI symptoms due to COVID improved.     #Transaminitis due to COVID 19. Monitored closely with patient receiving Remdesivir.      #Lovenox 30 mg bid for DVT prophylaxis. CBC: Recent Labs     09/20/21  1100 09/21/21  0524 09/22/21  0741   WBC 4.1 4.7 3.0*   HGB 14.7 14.9 14.6   HCT 43.8 45.2 44.5   MCV 86.8 89.4 89.4    184 231     BMP:   Recent Labs     09/20/21  1100 09/21/21  0524 09/22/21  0706    139 138   K 3.8 4.0 4.5    104 103   CO2 22 20* 18*   BUN 11 10 10   CREATININE 0.8 0.7 0.6     LIVER PROFILE:   Recent Labs     09/20/21  1100 09/21/21  0524 09/22/21  0706   AST 58* 51* 32   ALT 67* 61* 48*   LIPASE 38.0  --   --    BILITOT 0.6 0.4 0.4   ALKPHOS 60 58 57     PT/INR: No results for input(s): PROTIME, INR in the last 72 hours. APTT: No results for input(s): APTT in the last 72 hours. UA:  Recent Labs     09/20/21  1045   COLORU RED*   PHUR 6.5   WBCUA 0-2   RBCUA *   BACTERIA Rare*   CLARITYU CLOUDY*   SPECGRAV 1.010   LEUKOCYTESUR Negative   UROBILINOGEN 0.2   BILIRUBINUR SMALL*   BLOODU LARGE*   GLUCOSEU Negative     Respiratory panel positive for SARS-CoV2 by PCR    XR CHEST PORTABLE   Final Result   No acute process.               Discharge Medications     Medication List      START taking these medications    dexamethasone 6 MG tablet  Commonly known as: DECADRON  Take 1 tablet by mouth daily for 8 days  Start taking on: September 23, 2021           Where to Get Your Medications      These medications were sent to [id number] [facility], [address] [place] [address] [place], [address] [address]    Phone: [phone]   · dexamethasone 6 MG tablet           Discharge Condition/Location: Stable. Return to the hospital if she worsens. Monitor pulse ox at home. Follow Up: Follow up with PCP. More than 30 mts spent.         Danna Montanez MD 9/22/2021 3:17 PM

## 2021-09-22 NOTE — PROGRESS NOTES
Pt resting. Resp e/e. Shift assessment completed and charted. No needs. Will monitor.  Bridget Weiss RN

## 2021-09-22 NOTE — DISCHARGE INSTR - COC
Continuity of Care Form    Patient Name: Loren Weber   :  1990  MRN:  [id number]    Admit date:  2021  Discharge date:  ***    Code Status Order: Full Code   Advance Directives:      Admitting Physician:  Ruthie Morel MD  PCP: ALVARO Salas - CNP    Discharging Nurse: [facility] Unit/Room#: 0218/0218-01  Discharging Unit Phone Number: ***    Emergency Contact:   Extended Emergency Contact Information  Primary Emergency Contact: Don Fajardo  Address: [address] [place] [address] Phone: [phone]  Mobile Phone: [phone]  Relation: Spouse   needed? No    Past Surgical History:  History reviewed. No pertinent surgical history. Immunization History: There is no immunization history on file for this patient.     Active Problems:  Patient Active Problem List   Diagnosis Code    Febrile illness R50.9    Left leg cellulitis L03.116    Hypoxia R09.02    Acute respiratory failure with hypoxia (HCC) J96.01    Nausea vomiting and diarrhea R11.2, R19.7    Suspected COVID-19 virus infection Z20.822    Elevated LFTs R79.89    Acute respiratory disease due to COVID-19 virus U07.1, J06.9    COVID-19 U07.1       Isolation/Infection:   Isolation            Droplet Plus          Patient Infection Status       Infection Onset Added Last Indicated Last Indicated By Review Planned Expiration Resolved Resolved By    COVID-19 21 Respiratory Panel, Molecular, with COVID-19 (Restricted: peds pts or suitable admitted adults) 09/28/21 10/04/21      Resolved    COVID-19 Rule Out 21 Respiratory Panel, Molecular, with COVID-19 (Restricted: peds pts or suitable admitted adults) (Ordered)   21 Rule-Out Test Resulted    COVID-19 Rule Out 21 SARS-CoV-2 NAAT (Rapid) (Ordered)   21 Rule-Out Test Resulted            Nurse Assessment:  Last Vital Signs: /71   Pulse 87   Temp 97.6 °F (36.4 °C) (Oral)   Resp 18   Ht 5' 6\" (1.676 m)   Wt 300 lb (136.1 kg)   SpO2 90%   BMI 48.42 kg/m²     Last documented pain score (0-10 scale): Pain Level: 0  Last Weight:   Wt Readings from Last 1 Encounters:   09/20/21 300 lb (136.1 kg)     Mental Status:  {IP PT MENTAL STATUS:20030:::0}    IV Access:  { CARLOS IV ACCESS:621001803:::0}    Nursing Mobility/ADLs:  Walking   {CHP DME ADLs:586991286:::0}  Transfer  {CHP DME ADLs:275130473:::0}  Bathing  {CHP DME ADLs:673693837:::0}  Dressing  {CHP DME ADLs:650835207:::0}  Toileting  {CHP DME ADLs:085932642:::0}  Feeding  {CHP DME ADLs:455856113:::0}  Med Admin  {CHP DME ADLs:576108490:::0}  Med Delivery   { CARLOS MED Delivery:934816220:::0}    Wound Care Documentation and Therapy:        Elimination:  Continence: Bowel: {YES / BZ:82272}  Bladder: {YES / OP:86284}  Urinary Catheter: {Urinary Catheter:455346266:::0}   Colostomy/Ileostomy/Ileal Conduit: {YES / CA:36549}       Date of Last BM: ***    Intake/Output Summary (Last 24 hours) at 9/22/2021 1516  Last data filed at 9/22/2021 1308  Gross per 24 hour   Intake 1427.66 ml   Output --   Net 1427.66 ml     I/O last 3 completed shifts:   In: 1427.7 [P.O.:1080; IV Piggyback:347.7]  Out: -     Safety Concerns:     { CARLOS Safety Concerns:896815799:::0}    Impairments/Disabilities:      [employer] Impairments/Disabilities:984775367:::0}    Nutrition Therapy:  Current Nutrition Therapy:   [employer] Diet List:079188969:::0}    Routes of Feeding: {CHP DME Other Feedings:266247057:::0}  Liquids: {Slp liquid thickness:38329}  Daily Fluid Restriction: {CHP DME Yes amt example:045201245:::0}  Last Modified Barium Swallow with Video (Video Swallowing Test): {Done Not Done JOVK:668133087:::5}    Treatments at the Time of Hospital Discharge:   Respiratory Treatments: ***  Oxygen Therapy:  {Therapy; copd oxygen:10503:::0}  Ventilator:    { CC Vent List:415962760:::0}    Rehab Therapies: {THERAPEUTIC INTERVENTION:0062272658}  Weight Bearing Status/Restrictions: { CC Weight Bearin:::0}  Other Medical Equipment (for information only, NOT a DME order):  {EQUIPMENT:889914591}  Other Treatments: ***    Patient's personal belongings (please select all that are sent with patient):  {CHP DME Belongings:596812048:::0}    RN SIGNATURE:  {Esignature:740237472:::0}    CASE MANAGEMENT/SOCIAL WORK SECTION    Inpatient Status Date: ***    Readmission Risk Assessment Score:  Readmission Risk              Risk of Unplanned Readmission:  8           Discharging to Facility/ Agency   Name:   Address:  Phone:  Fax:    Dialysis Facility (if applicable)   Name:  Address:  Dialysis Schedule:  Phone:  Fax:    / signature: {Esignature:385963531:::0}    PHYSICIAN SECTION    Prognosis: {Prognosis:0523674912:::0}    Condition at Discharge: [address] [place] [address] Patient Condition:406854684:::0}    Rehab Potential (if transferring to Rehab): {Prognosis:4892488201:::0}    Recommended Labs or Other Treatments After Discharge: ***    Physician Certification: I certify the above information and transfer of Ruma Luna  is necessary for the continuing treatment of the diagnosis listed and that she requires {Admit to Appropriate Level of Care:93664:::0} for {GREATER/LESS:110047154} 30 days.      Update Admission H&P: {CHP DME Changes in HandP:765301676:::0}    PHYSICIAN SIGNATURE:  {Esignature:315619336:::0}

## 2021-09-23 ENCOUNTER — CARE COORDINATION (OUTPATIENT)
Dept: CASE MANAGEMENT | Age: 31
End: 2021-09-23

## 2021-09-23 DIAGNOSIS — U07.1 COVID-19: Primary | ICD-10-CM

## 2021-10-01 ENCOUNTER — CARE COORDINATION (OUTPATIENT)
Dept: CASE MANAGEMENT | Age: 31
End: 2021-10-01

## 2021-10-01 NOTE — CARE COORDINATION
Patient contacted regarding COVID-19 diagnosis. COVID-19 Detected on 9/20/2021. Care Transition Nurse contacted the patient by telephone to perform follow-up assessment. Symptoms reviewed with patient who verbalized the following symptoms: no new symptoms and no worsening symptoms. Due to no new or worsening symptoms encounter was not routed to provider for escalation. Educated patient about risk for severe COVID-19 due to risk factors according to CDC guidelines. CTN reviewed medical action plan and red flag symptoms with patient who verbalized understanding. Discussed COVID vaccination status No. Education provided on COVID-19 vaccination as appropriate. Discussed exposure protocols and quarantine with CDC Guidelines. Patient was given an opportunity to verbalize any questions and concerns and agrees to contact the CTN or health care provider for questions related to their healthcare. Was patient discharged with a pulse oximeter? No     Reports feeling improved. Still with fatigue. Checks her SaO2 in mornings and usually 93-95% RA. Denies headaches. States she has some UTI symptoms. Denies fever, chills. Denies painful or burning with urination. States her urine is more yellow than typical for her but no symptoms UTI after review. Confirmed that Pop[facility] is still her PCP. They contacted her to schedule hospital follow up and she postponed this appointment unsure if she wants or needs to follow up if doing well. CTN reviewed chart and explained why f/up is important. Encouraged her to schedule this appointment as soon as she completes her quarantine. CTN provided contact information for future reference. Plan for follow-up call in 5-7 days based on severity of symptoms and risk factors. John Dangelo RN  Care Transition Nurse  [phone] mobile    No future appointments.

## 2021-10-08 ENCOUNTER — CARE COORDINATION (OUTPATIENT)
Dept: CASE MANAGEMENT | Age: 31
End: 2021-10-08

## 2021-10-08 NOTE — CARE COORDINATION
Patient contacted regarding COVID-19 diagnosis. COVID-19 Detected on 9/20/2021. Unable to reach or leave message at this time. Luciano Lang, RN  Care Transition Nurse  [phone] mobile    No future appointments.

## 2021-10-13 ENCOUNTER — CARE COORDINATION (OUTPATIENT)
Dept: CASE MANAGEMENT | Age: 31
End: 2021-10-13

## 2023-03-05 NOTE — ED PROVIDER NOTES
Emergency Department Provider Note  Location: [address] EMERGENCY DEPARTMENT  9/20/2021     Patient Identification  Rudy Thompson is a 32 y.o. female    Chief Complaint  Diarrhea (with vomiting x 4 days.)          HPI  (History provided by patient)  Patient is a 35-year-old female with no reported medical history who presents with chief complaint of nausea vomiting diarrhea and general fatigue over the past 3 to 4 days. Here with her domestic partner. Reports multiple episodes nonbloody nonbilious emesis and watery stools no blood. No abdominal pain. She states that she feels fatigued otherwise no chest pain or shortness of breath. She does report a dry nonproductive cough and chills and subjective fevers. Her significant other had similar symptoms last week. They live in an [facility] community and there have been positive Covid contacts though a few weeks ago. Patient noted to be mildly hypoxic on arrival requiring supplemental O2. She denies any shortness of breath or pleurisy or lightheadedness. I have reviewed the following nursing documentation:  Allergies: No Known Allergies    Past medical history:  has a past medical history of Cellulitis and Lymphedema. Past surgical history:  has no past surgical history on file. Home medications:   Prior to Admission medications    Not on File       Social history:  reports that she has never smoked. She has never used smokeless tobacco. She reports that she does not drink alcohol and does not use drugs. Family history:  History reviewed. No pertinent family history. ROS  Review of Systems   Constitutional: Positive for chills, fatigue and fever. HENT: Negative for congestion and rhinorrhea. Eyes: Negative for photophobia and visual disturbance. Respiratory: Negative for cough, shortness of breath and wheezing. Cardiovascular: Negative for chest pain and palpitations.    Gastrointestinal: Positive for diarrhea, nausea and vomiting. Negative for abdominal distention. Genitourinary: Negative for dysuria and hematuria. Musculoskeletal: Negative for back pain and neck pain. Skin: Negative for rash and wound. Neurological: Negative for syncope and weakness. Psychiatric/Behavioral: Negative for agitation and confusion. Exam  ED Triage Vitals [09/20/21 1049]   BP Temp Temp src Pulse Resp SpO2 Height Weight   132/79 99.1 °F (37.3 °C) -- -- 20 (!) 88 % 5' 6\" (1.676 m) 290 lb (131.5 kg)       Physical Exam  Vitals and nursing note reviewed. Constitutional:       General: She is not in acute distress. Appearance: She is well-developed. HENT:      Head: Normocephalic and atraumatic. Nose: Nose normal. No congestion. Eyes:      Extraocular Movements: Extraocular movements intact. Pupils: Pupils are equal, round, and reactive to light. Cardiovascular:      Rate and Rhythm: Normal rate and regular rhythm. Heart sounds: No murmur heard. Pulmonary:      Effort: Pulmonary effort is normal.      Breath sounds: Normal breath sounds. Abdominal:      General: There is no distension. Palpations: Abdomen is soft. Tenderness: There is no abdominal tenderness. There is no guarding or rebound. Comments: Negative Ahn's   Musculoskeletal:         General: No tenderness or deformity. Normal range of motion. Cervical back: Normal range of motion and neck supple. Right lower leg: No edema. Left lower leg: No edema. Skin:     General: Skin is warm. Findings: No rash. Neurological:      Mental Status: She is alert and oriented to person, place, and time. Motor: No abnormal muscle tone.       Coordination: Coordination normal.   Psychiatric:         Mood and Affect: Mood normal.         Behavior: Behavior normal.           ED Course    ED Medication Orders (From admission, onward)    Start Ordered     Status Ordering Provider    09/20/21 1130 09/20/21 1108  0.9 % sodium chloride bolus  ONCE      Last MAR action: New Bag - by Milton LOCKHART on 09/20/21 at 1143 ÁNGELA JOHNSON    09/20/21 1130 09/20/21 1108  ondansetron (ZOFRAN) injection 4 mg  ONCE      Last MAR action: Given - by Milton LOCKHART on 09/20/21 at 1143 ÁNGELA JOHNSON          EKG  Sinus tachycardic rhythm rate 105 normal axis normal intervals no evidence of conduction abnormalities no diagnostic ischemic changes noted, . Radiology  XR CHEST PORTABLE    Result Date: 9/20/2021  EXAMINATION: ONE XRAY VIEW OF THE CHEST 9/20/2021 11:24 am COMPARISON: None. HISTORY: ORDERING SYSTEM PROVIDED HISTORY: hypoxia TECHNOLOGIST PROVIDED HISTORY: Reason for exam:->hypoxia Reason for Exam: diarrhea with vomiting x 4 days, sob due to weakness, cough Acuity: Acute Type of Exam: Initial FINDINGS: The lungs are without acute focal process. There is no effusion or pneumothorax. The cardiomediastinal silhouette is without acute process. The osseous structures are without acute process. No acute process.          Labs  Results for orders placed or performed during the hospital encounter of 09/20/21   Urinalysis   Result Value Ref Range    Color, UA RED (A) Straw/Yellow    Clarity, UA CLOUDY (A) Clear    Glucose, Ur Negative Negative mg/dL    Bilirubin Urine SMALL (A) Negative    Ketones, Urine 40 (A) Negative mg/dL    Specific Gravity, UA 1.010 1.005 - 1.030    Blood, Urine LARGE (A) Negative    pH, UA 6.5 5.0 - 8.0    Protein, UA 30 (A) Negative mg/dL    Urobilinogen, Urine 0.2 <2.0 E.U./dL    Nitrite, Urine Negative Negative    Leukocyte Esterase, Urine Negative Negative    Microscopic Examination YES     Urine Type NotGiven    Pregnancy, Urine   Result Value Ref Range    HCG(Urine) Pregnancy Test Negative Detects HCG level >20 MIU/mL   CBC Auto Differential   Result Value Ref Range    WBC 4.1 4.0 - 11.0 K/uL    RBC 5.05 4.00 - 5.20 M/uL    Hemoglobin 14.7 12.0 - 16.0 g/dL    Hematocrit 43.8 36.0 - 48.0 %    MCV 86.8 80.0 - 100.0 fL    MCH 29.1 26.0 - 34.0 pg    MCHC 33.5 31.0 - 36.0 g/dL    RDW 14.3 12.4 - 15.4 %    Platelets 799 581 - 676 K/uL    MPV 8.0 5.0 - 10.5 fL    Neutrophils % 61.6 %    Lymphocytes % 29.6 %    Monocytes % 8.3 %    Eosinophils % 0.1 %    Basophils % 0.4 %    Neutrophils Absolute 2.6 1.7 - 7.7 K/uL    Lymphocytes Absolute 1.2 1.0 - 5.1 K/uL    Monocytes Absolute 0.3 0.0 - 1.3 K/uL    Eosinophils Absolute 0.0 0.0 - 0.6 K/uL    Basophils Absolute 0.0 0.0 - 0.2 K/uL   Comprehensive Metabolic Panel w/ Reflex to MG   Result Value Ref Range    Sodium 136 136 - 145 mmol/L    Potassium reflex Magnesium 3.8 3.5 - 5.1 mmol/L    Chloride 100 99 - 110 mmol/L    CO2 22 21 - 32 mmol/L    Anion Gap 14 3 - 16    Glucose 109 (H) 70 - 99 mg/dL    BUN 11 7 - 20 mg/dL    CREATININE 0.8 0.6 - 1.1 mg/dL    GFR Non-African American >60 >60    GFR African American >60 >60    Calcium 8.9 8.3 - 10.6 mg/dL    Total Protein 7.8 6.4 - 8.2 g/dL    Albumin 4.4 3.4 - 5.0 g/dL    Albumin/Globulin Ratio 1.3 1.1 - 2.2    Total Bilirubin 0.6 0.0 - 1.0 mg/dL    Alkaline Phosphatase 60 40 - 129 U/L    ALT 67 (H) 10 - 40 U/L    AST 58 (H) 15 - 37 U/L    Globulin 3.4 g/dL   Troponin   Result Value Ref Range    Troponin <0.01 <0.01 ng/mL   Lipase   Result Value Ref Range    Lipase 38.0 13.0 - 60.0 U/L   HCG Qualitative, Serum   Result Value Ref Range    hCG Qual Negative Detects HCG level >10 MIU/mL   Microscopic Urinalysis   Result Value Ref Range    WBC, UA 0-2 0 - 5 /HPF    RBC, UA  (A) 0 - 4 /HPF    Epithelial Cells, UA 0-1 0 - 5 /HPF    Bacteria, UA Rare (A) None Seen /HPF   EKG 12 Lead   Result Value Ref Range    Ventricular Rate 106 BPM    Atrial Rate 106 BPM    P-R Interval 138 ms    QRS Duration 80 ms    Q-T Interval 330 ms    QTc Calculation (Bazett) 438 ms    P Axis 53 degrees    R Axis 67 degrees    T Axis 45 degrees    Diagnosis       Sinus tachycardiaOtherwise normal ECGNo previous ECGs available         MDM  Patient seen and Dictation system and may contain errors related to that system including errors in grammar, punctuation, and spelling, as well as words and phrases that may be inappropriate. If there are any questions or concerns please feel free to contact the dictating provider for clarification.      Ventura Teresa MD  [address] MD  09/20/21 6786 Within functional limits

## 2024-01-15 NOTE — CARE COORDINATION
bathroom, if available. Animals: You should restrict contact with pets and other animals while you are sick with COVID-19, just like you would around other people. Although there have not been reports of pets or other animals becoming sick with COVID-19, it is still recommended that people sick with COVID-19 limit contact with animals until more information is known about the virus. When possible, have another member of your household care for your animals while you are sick. If you are sick with COVID-19, avoid contact with your pet, including petting, snuggling, being kissed or licked, and sharing food. If you must care for your pet or be around animals while you are sick, wash your hands before and after you interact with pets and wear a facemask. Call ahead before visiting your doctor  If you have a medical appointment, call the healthcare provider and tell them that you have or may have COVID-19. This will help the healthcare providers office take steps to keep other people from getting infected or exposed. Wear a facemask  You should wear a facemask when you are around other people (e.g., sharing a room or vehicle) or pets and before you enter a healthcare providers office. If you are not able to wear a facemask (for example, because it causes trouble breathing), then people who live with you should not stay in the same room with you, or they should wear a facemask if they enter your room. Cover your coughs and sneezes  Cover your mouth and nose with a tissue when you cough or sneeze. Throw used tissues in a lined trash can. Immediately wash your hands with soap and water for at least 20 seconds or, if soap and water are not available, clean your hands with an alcohol-based hand  that contains at least 60% alcohol.   Clean your hands often  Wash your hands often with soap and water for at least 20 seconds, especially after blowing your nose, coughing, or sneezing; going to the bathroom; and before eating or preparing food. If soap and water are not readily available, use an alcohol-based hand  with at least 60% alcohol, covering all surfaces of your hands and rubbing them together until they feel dry. Soap and water are the best option if hands are visibly dirty. Avoid touching your eyes, nose, and mouth with unwashed hands. Avoid sharing personal household items  You should not share dishes, drinking glasses, cups, eating utensils, towels, or bedding with other people or pets in your home. After using these items, they should be washed thoroughly with soap and water. Clean all high-touch surfaces everyday  High touch surfaces include counters, tabletops, doorknobs, bathroom fixtures, toilets, phones, keyboards, tablets, and bedside tables. Also, clean any surfaces that may have blood, stool, or body fluids on them. Use a household cleaning spray or wipe, according to the label instructions. Labels contain instructions for safe and effective use of the cleaning product including precautions you should take when applying the product, such as wearing gloves and making sure you have good ventilation during use of the product. Monitor your symptoms  Seek prompt medical attention if your illness is worsening (e.g., difficulty breathing). Before seeking care, call your healthcare provider and tell them that you have, or are being evaluated for, COVID-19. Put on a facemask before you enter the facility. These steps will help the healthcare providers office to keep other people in the office or waiting room from getting infected or exposed. Ask your healthcare provider to call the local or state health department. Persons who are placed under active monitoring or facilitated self-monitoring should follow instructions provided by their local health department or occupational health professionals, as appropriate. When working with your local health department check their available hours.   If you have a medical emergency and need to call 911, notify the dispatch personnel that you have, or are being evaluated for COVID-19. If possible, put on a facemask before emergency medical services arrive. Discontinuing home isolation  Patients with confirmed COVID-19 should remain under home isolation precautions until the risk of secondary transmission to others is thought to be low. The decision to discontinue home isolation precautions should be made on a case-by-case basis, in consultation with healthcare providers and state and local health departments. [facility] 45 Transitions Initial Follow Up Call    Call within 2 business days of discharge: Yes    Patient: Gilbert Sylvester Patient : 1990   MRN: <[id number]>  Reason for Admission: Covid  Discharge Date: 21 RARS: Readmission Risk Score: 8      Last Discharge [address] [address]       Complaint Diagnosis Description Type Department Provider    21 Diarrhea Acute respiratory failure with hypoxia ([facility] [address] . .. ED to Hosp-Admission (Discharged) (ADMITTED) [facility] Raj Gordillo MD; Avila Garcia .. Spoke with: [address] [facility] [address]: [facility]  Non-face-to-face services provided:  Obtained and reviewed discharge summary and/or continuity of care documents   Transitions of Care Initial Call    Was this an external facility discharge? No Discharge Facility:     Challenges to be reviewed by the provider   Additional needs identified to be addressed with provider: No  none             Method of communication with provider : none      Advance Care Planning:   Does patient have an Advance Directive: Not on file  Was this a readmission? No  Patient stated reason for admission: Covid  Patients top risk factors for readmission: ineffective coping    Care Transition Nurse (CTN) contacted the patient by telephone to perform post hospital discharge assessment. Verified name and  with patient as identifiers.  Provided introduction to self, and explanation of the CTN role. CTN reviewed discharge instructions, medical action plan and red flags with patient who verbalized understanding. Patient given an opportunity to ask questions and does not have any further questions or concerns at this time. Were discharge instructions available to patient? Yes. Reviewed appropriate site of care based on symptoms and resources available to patient including: PCP, Urgent care clinics and When to call 911. The patient agrees to contact the PCP office for questions related to their healthcare. Medication reconciliation was performed with patient, who verbalizes understanding of administration of home medications. Advised obtaining a 90-day supply of all daily and as-needed medications. Covid Risk Education     Educated patient about risk for severe COVID-19 due to risk factors according to CDC guidelines. LPN CC reviewed discharge instructions, medical action plan and red flag symptoms with the patient who verbalized understanding. Discussed COVID vaccination status: Yes. Education provided on COVID-19 vaccination as appropriate. Discussed exposure protocols and quarantine with CDC Guidelines. Patient was given an opportunity to verbalize any questions and concerns and agrees to contact LPN CC or health care provider for questions related to their healthcare. Reviewed and educated patient on any new and changed medications related to discharge diagnosis. Was patient discharged with a pulse oximeter? No Discussed and confirmed pulse oximeter discharge instructions and when to notify provider or seek emergency care. LPN CC provided contact information. Plan for follow-up call in 5-7 days based on severity of symptoms and risk factors. Plan for next call: follow up appointment-Did you schedule f/u? This PCP spoke with pt and pt stated that she was doing well. Patient denied any worsening symptoms.  Denied fever, chills, N/V and any difficulty breathing at this time. Denied difficulty with urination, BMs or appetite. Pt did state that she was still weak but denied SOB, pain and cough. Medication review performed and patient verbalized understanding and is taking all medications as prescribed. Pt denied any needs or concerns at this time and stated that she will schedule f/u after ten days of quarantine. Advised pt to immediately report any worsening symptoms to the PCP. Patient verbalized understanding and agreed. Harsha Pina LPN, [address] [facility]  PH: [phone]            Care Transitions 24 Hour Call    Schedule Follow Up Appointment with PCP: Declined  Do you have any ongoing symptoms?: Yes  Patient-reported symptoms: Weakness  Interventions for patient-reported symptoms: Other  Do you have all of your prescriptions and are they filled?: Yes  Have you been contacted by a [id number] [facility] Pharmacist?: No  Have you scheduled your follow up appointment?: No  Were you discharged with any Home Care or Post Acute Services: No  Care Transitions Interventions  No Identified Needs         Follow Up  No future appointments.     Harsha Pina LPN Self